# Patient Record
Sex: MALE | Race: BLACK OR AFRICAN AMERICAN
[De-identification: names, ages, dates, MRNs, and addresses within clinical notes are randomized per-mention and may not be internally consistent; named-entity substitution may affect disease eponyms.]

---

## 2018-02-10 ENCOUNTER — HOSPITAL ENCOUNTER (OUTPATIENT)
Dept: HOSPITAL 75 - ER | Age: 53
Setting detail: OBSERVATION
LOS: 1 days | Discharge: HOME | End: 2018-02-11
Attending: FAMILY MEDICINE | Admitting: FAMILY MEDICINE
Payer: SELF-PAY

## 2018-02-10 VITALS — HEIGHT: 68 IN | WEIGHT: 182.19 LBS | BODY MASS INDEX: 27.61 KG/M2

## 2018-02-10 VITALS — SYSTOLIC BLOOD PRESSURE: 180 MMHG | DIASTOLIC BLOOD PRESSURE: 97 MMHG

## 2018-02-10 DIAGNOSIS — F10.920: ICD-10-CM

## 2018-02-10 DIAGNOSIS — Z79.899: ICD-10-CM

## 2018-02-10 DIAGNOSIS — I10: ICD-10-CM

## 2018-02-10 DIAGNOSIS — R07.89: Primary | ICD-10-CM

## 2018-02-10 LAB
ALBUMIN SERPL-MCNC: 4.5 GM/DL (ref 3.2–4.5)
ALP SERPL-CCNC: 107 U/L (ref 40–136)
ALT SERPL-CCNC: 27 U/L (ref 0–55)
AMYLASE SERPL-CCNC: 166 U/L (ref 25–125)
APAP SERPL-MCNC: < 10 UG/ML (ref 10–30)
APTT BLD: 25 SEC (ref 24–35)
BARBITURATES UR QL: NEGATIVE
BASOPHILS # BLD AUTO: 0.1 10^3/UL (ref 0–0.1)
BASOPHILS NFR BLD AUTO: 1 % (ref 0–10)
BENZODIAZ UR QL SCN: NEGATIVE
BILIRUB SERPL-MCNC: 0.2 MG/DL (ref 0.1–1)
BUN/CREAT SERPL: 16
CALCIUM SERPL-MCNC: 9.2 MG/DL (ref 8.5–10.1)
CHLORIDE SERPL-SCNC: 109 MMOL/L (ref 98–107)
CO2 SERPL-SCNC: 24 MMOL/L (ref 21–32)
COCAINE UR QL: NEGATIVE
CREAT SERPL-MCNC: 1.22 MG/DL (ref 0.6–1.3)
EOSINOPHIL # BLD AUTO: 0.1 10^3/UL (ref 0–0.3)
EOSINOPHIL NFR BLD AUTO: 1 % (ref 0–10)
ERYTHROCYTE [DISTWIDTH] IN BLOOD BY AUTOMATED COUNT: 14.7 % (ref 10–14.5)
GFR SERPLBLD BASED ON 1.73 SQ M-ARVRAT: > 60 ML/MIN
GLUCOSE SERPL-MCNC: 85 MG/DL (ref 70–105)
HCT VFR BLD CALC: 38 % (ref 40–54)
HGB BLD-MCNC: 14.1 G/DL (ref 13.3–17.7)
INR PPP: 0.9 (ref 0.8–1.4)
LIPASE SERPL-CCNC: 13 U/L (ref 8–78)
LYMPHOCYTES # BLD AUTO: 3 X 10^3 (ref 1–4)
LYMPHOCYTES NFR BLD AUTO: 33 % (ref 12–44)
MAGNESIUM SERPL-MCNC: 2.5 MG/DL (ref 1.8–2.4)
MANUAL DIFFERENTIAL PERFORMED BLD QL: NO
MCH RBC QN AUTO: 30 PG (ref 25–34)
MCHC RBC AUTO-ENTMCNC: 37 G/DL (ref 32–36)
MCV RBC AUTO: 81 FL (ref 80–99)
METHADONE UR QL SCN: NEGATIVE
METHAMPHETAMINE SCREEN URINE S: NEGATIVE
MONOCYTES # BLD AUTO: 0.5 X 10^3 (ref 0–1)
MONOCYTES NFR BLD AUTO: 5 % (ref 0–12)
MYOGLOBIN SERPL-MCNC: 104.8 NG/ML (ref 10–92)
MYOGLOBIN SERPL-MCNC: 87.1 NG/ML (ref 10–92)
NEUTROPHILS # BLD AUTO: 5.4 X 10^3 (ref 1.8–7.8)
NEUTROPHILS NFR BLD AUTO: 60 % (ref 42–75)
OPIATES UR QL SCN: NEGATIVE
OXYCODONE UR QL: NEGATIVE
PLATELET # BLD: 274 10^3/UL (ref 130–400)
PMV BLD AUTO: 11.2 FL (ref 7.4–10.4)
POTASSIUM SERPL-SCNC: 4.7 MMOL/L (ref 3.6–5)
PROPOXYPH UR QL: NEGATIVE
PROT SERPL-MCNC: 8 GM/DL (ref 6.4–8.2)
PROTHROMBIN TIME: 11.9 SEC (ref 12.2–14.7)
RBC # BLD AUTO: 4.69 10^6/UL (ref 4.35–5.85)
SALICYLATES SERPL-MCNC: < 5 MG/DL (ref 5–20)
SODIUM SERPL-SCNC: 146 MMOL/L (ref 135–145)
TRICYCLICS UR QL SCN: NEGATIVE
WBC # BLD AUTO: 9 10^3/UL (ref 4.3–11)

## 2018-02-10 PROCEDURE — 85610 PROTHROMBIN TIME: CPT

## 2018-02-10 PROCEDURE — 83735 ASSAY OF MAGNESIUM: CPT

## 2018-02-10 PROCEDURE — 96360 HYDRATION IV INFUSION INIT: CPT

## 2018-02-10 PROCEDURE — 80329 ANALGESICS NON-OPIOID 1 OR 2: CPT

## 2018-02-10 PROCEDURE — 83874 ASSAY OF MYOGLOBIN: CPT

## 2018-02-10 PROCEDURE — 85730 THROMBOPLASTIN TIME PARTIAL: CPT

## 2018-02-10 PROCEDURE — 80053 COMPREHEN METABOLIC PANEL: CPT

## 2018-02-10 PROCEDURE — 80320 DRUG SCREEN QUANTALCOHOLS: CPT

## 2018-02-10 PROCEDURE — 84484 ASSAY OF TROPONIN QUANT: CPT

## 2018-02-10 PROCEDURE — 85025 COMPLETE CBC W/AUTO DIFF WBC: CPT

## 2018-02-10 PROCEDURE — 80061 LIPID PANEL: CPT

## 2018-02-10 PROCEDURE — 83690 ASSAY OF LIPASE: CPT

## 2018-02-10 PROCEDURE — 96361 HYDRATE IV INFUSION ADD-ON: CPT

## 2018-02-10 PROCEDURE — 36415 COLL VENOUS BLD VENIPUNCTURE: CPT

## 2018-02-10 PROCEDURE — 93041 RHYTHM ECG TRACING: CPT

## 2018-02-10 PROCEDURE — 71045 X-RAY EXAM CHEST 1 VIEW: CPT

## 2018-02-10 PROCEDURE — 85379 FIBRIN DEGRADATION QUANT: CPT

## 2018-02-10 PROCEDURE — 93005 ELECTROCARDIOGRAM TRACING: CPT

## 2018-02-10 PROCEDURE — 80306 DRUG TEST PRSMV INSTRMNT: CPT

## 2018-02-10 PROCEDURE — 82150 ASSAY OF AMYLASE: CPT

## 2018-02-10 RX ADMIN — SODIUM CHLORIDE SCH MLS/HR: 900 INJECTION, SOLUTION INTRAVENOUS at 18:30

## 2018-02-10 NOTE — ED CHEST PAIN
General


Stated Complaint:  CP


Source:  patient


Exam Limitations:  no limitations





History of Present Illness


Date Seen by Provider:  Feb 10, 2018


Time Seen by Provider:  14:04


Initial Comments


Here with report of central chest pain that goes to his back.  She is very 

vague in the descriptions and admits to drinking quite a bit of beer today.  He 

states that he basically started drinking after he got up this morning.  States 

the chest pain started yesterday but he had to go to work.  He works at [employer].  He did work all day yesterday and went home last night.  This 

morning, after drinking quite a bit, he thought that he probably ought to get 

his chest pain checked out.  It is unclear chest pain this morning but may have 

had some.  He denies chest pain currently.  He does admit to taking 2 aspirin 

but is not sure if they are baby aspirin or regular strength but they were from 

[employer].  Denies nausea, vomiting, sweating or weakness.


Timing/Duration:  intermittent, gone now, 2-3 days


Severity/Quality:  moderate, aching


Location:  central


Radiation:  back


Prior CP/Workup:  no prior chest pain


ASA po PTA:  Yes


NTG SL PTA:  No


Associated Symptoms:  back pain, No fatigue, No nausea/vomiting, No shortness 

of breath, No weakness





Allergies and Home Medications


Allergies


Coded Allergies:  


     No Known Drug Allergies (Unverified , 2/10/18)





Review of Systems


Constitutional:  see HPI, No chills, No fever


EENTM:  No Symptoms Reported


Respiratory:  No Symptoms Reported


Cardiovascular:  See HPI, Chest Pain, Denies Edema


Gastrointestinal:  Denies Nausea, Denies Vomiting


Genitourinary:  No Symptoms Reported


Musculoskeletal:  see HPI, back pain, No neck pain


Skin:  no symptoms reported


Psychiatric/Neurological:  No Symptoms Reported





All Other Systems Reviewed


Negative Unless Noted:  Yes





Past Medical-Social-Family Hx


Patient Social History


Alcohol Use:  Regular Use


Recreational Drug Use:  No


Smoking Status:  Never a Smoker





Surgeries


History of Surgeries:  Yes


Surgeries:  Orthopedic





Respiratory


History of Respiratory Disorde:  No





Cardiovascular


History of Cardiac Disorders:  No





Neurological


History of Neurological Disord:  No





Genitourinary


History of Genitourinary Disor:  No





Gastrointestinal


History of Gastrointestinal Di:  No





Musculoskeletal


History of Musculoskeletal Dis:  No


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 

the elbow)





Reviewed Nursing Assessment


Reviewed/Agree w Nursing PMH:  Yes





Family Medical History


Significant Family History:  No Pertinent Family Hx





Physical Exam


Vital Signs





Vital Signs - First Documented




















Capillary Refill :


General Appearance:  No Apparent Distress, WD/WN, Other (inebriated with 

slurred speech)


HEENT:  PERRL/EOMI, Pharynx Normal


Neck:  Non Tender, Supple


Respiratory:  Lungs Clear, Normal Breath Sounds


Cardiovascular:  Regular Rate, Rhythm, No Murmur


Gastrointestinal:  Non Tender, Soft


Extremity:  Normal Range of Motion, Non Tender


Neurologic/Psychiatric:  Alert, Oriented x3


Skin:  Normal Color, Warm/Dry





Progress/Results/Core Measures


Results/Orders


Lab Results





Laboratory Tests








Test


  2/10/18


14:13 2/10/18


14:56 2/10/18


16:08 Range/Units


 


 


White Blood Count


  9.0 


  


  


  4.3-11.0


10^3/uL


 


Red Blood Count


  4.69 


  


  


  4.35-5.85


10^6/uL


 


Hemoglobin 14.1    13.3-17.7  G/DL


 


Hematocrit 38 L   40-54  %


 


Mean Corpuscular Volume 81    80-99  FL


 


Mean Corpuscular Hemoglobin 30    25-34  PG


 


Mean Corpuscular Hemoglobin


Concent 37 H


  


  


  32-36  G/DL


 


 


Red Cell Distribution Width 14.7 H   10.0-14.5  %


 


Platelet Count


  274 


  


  


  130-400


10^3/uL


 


Mean Platelet Volume 11.2 H   7.4-10.4  FL


 


Neutrophils (%) (Auto) 60    42-75  %


 


Lymphocytes (%) (Auto) 33    12-44  %


 


Monocytes (%) (Auto) 5    0-12  %


 


Eosinophils (%) (Auto) 1    0-10  %


 


Basophils (%) (Auto) 1    0-10  %


 


Neutrophils # (Auto) 5.4    1.8-7.8  X 10^3


 


Lymphocytes # (Auto) 3.0    1.0-4.0  X 10^3


 


Monocytes # (Auto) 0.5    0.0-1.0  X 10^3


 


Eosinophils # (Auto)


  0.1 


  


  


  0.0-0.3


10^3/uL


 


Basophils # (Auto)


  0.1 


  


  


  0.0-0.1


10^3/uL


 


Prothrombin Time 11.9 L   12.2-14.7  SEC


 


INR Comment 0.9    0.8-1.4  


 


Activated Partial


Thromboplast Time 25 


  


  


  24-35  SEC


 


 


D-Dimer


  < 0.27 


  


  


  0.00-0.49


UG/ML


 


Sodium Level 146 H   135-145  MMOL/L


 


Potassium Level 4.7    3.6-5.0  MMOL/L


 


Chloride Level 109 H     MMOL/L


 


Carbon Dioxide Level 24    21-32  MMOL/L


 


Anion Gap 13    5-14  MMOL/L


 


Blood Urea Nitrogen 20 H   7-18  MG/DL


 


Creatinine


  1.22 


  


  


  0.60-1.30


MG/DL


 


Estimat Glomerular Filtration


Rate > 60 


  


  


   


 


 


BUN/Creatinine Ratio 16     


 


Glucose Level 85      MG/DL


 


Calcium Level 9.2    8.5-10.1  MG/DL


 


Magnesium Level 2.5 H   1.8-2.4  MG/DL


 


Total Bilirubin 0.2    0.1-1.0  MG/DL


 


Aspartate Amino Transf


(AST/SGOT) 37 H


  


  


  5-34  U/L


 


 


Alanine Aminotransferase


(ALT/SGPT) 27 


  


  


  0-55  U/L


 


 


Alkaline Phosphatase 107      U/L


 


Myoglobin


  87.1 


  


  104.8 H


  10.0-92.0


NG/ML


 


Troponin I < 0.30   < 0.30  <0.30  NG/ML


 


Total Protein 8.0    6.4-8.2  GM/DL


 


Albumin 4.5    3.2-4.5  GM/DL


 


Amylase Level 166 H     U/L


 


Lipase 13    8-78  U/L


 


Salicylates Level < 5.0 L   5.0-20.0  MG/DL


 


Acetaminophen Level < 10 L   10-30  UG/ML


 


Serum Alcohol 319 *H   <10  MG/DL


 


Urine Opiates Screen  NEGATIVE   NEGATIVE  


 


Urine Oxycodone Screen  NEGATIVE   NEGATIVE  


 


Urine Methadone Screen  NEGATIVE   NEGATIVE  


 


Urine Propoxyphene Screen  NEGATIVE   NEGATIVE  


 


Urine Barbiturates Screen  NEGATIVE   NEGATIVE  


 


Ur Tricyclic Antidepressants


Screen 


  NEGATIVE 


  


  NEGATIVE  


 


 


Urine Phencyclidine Screen  NEGATIVE   NEGATIVE  


 


Urine Amphetamines Screen  NEGATIVE   NEGATIVE  


 


Urine Methamphetamines Screen  NEGATIVE   NEGATIVE  


 


Urine Benzodiazepines Screen  NEGATIVE   NEGATIVE  


 


Urine Cocaine Screen  NEGATIVE   NEGATIVE  


 


Urine Cannabinoids Screen  NEGATIVE   NEGATIVE  








My Orders





Orders - ETHAN LENTZ MD


Cbc With Automated Diff (2/10/18 14:10)


Magnesium (2/10/18 14:10)


Chest 1 View, Ap/Pa Only (2/10/18 14:10)


Ekg Tracing (2/10/18 14:10)


Cardiac Profile 1 (2/10/18 14:10)


Comprehensive Metabolic Panel (2/10/18 14:10)


Myoglobin Serum (2/10/18 14:10)


Protime With Inr (2/10/18 14:10)


Partial Thromboplastin Time (2/10/18 14:10)


O2 (2/10/18 14:10)


Monitor-Rhythm Ecg Trace Only (2/10/18 14:10)


Lipid Panel (18 06:00)


Aspirin Chewable Tablet (Baby Aspirin Ch (2/10/18 14:15)


Saline Lock/Iv-Start (2/10/18 14:10)


Lipase (2/10/18 14:10)


Amylase (2/10/18 14:10)


Fibrin Degradation Products (2/10/18 14:10)


Acetaminophen (2/10/18 14:10)


Alcohol (2/10/18 14:10)


Drug Screen Stat (Urine) (2/10/18 14:10)


Salicylate (2/10/18 14:10)


Ns Iv 500 Ml (Sodium Chloride 0.9%) (2/10/18 14:25)


Troponin I (2/10/18 16:28)


Myoglobin Serum (2/10/18 16:28)


Ns Iv 500 Ml (Sodium Chloride 0.9%) (2/10/18 16:28)


Ekg Tracing (2/10/18 16:28)





Medications Given in ED





Current Medications








 Medications  Dose


 Ordered  Sig/Enedina


 Route  Start Time


 Stop Time Status Last Admin


Dose Admin


 


 Aspirin  324 mg  ONCE  ONCE


 PO  2/10/18 14:15


 2/10/18 14:16 DC 2/10/18 14:21


324 MG


 


 Sodium Chloride  500 ml @ 0


 mls/hr  Q0M ONCE


 IV  2/10/18 14:25


 2/10/18 14:26 DC 2/10/18 14:45


0 MLS/HR


 


 Sodium Chloride  500 ml @ 0


 mls/hr  Q0M ONCE


 IV  2/10/18 16:28


 2/10/18 16:30 DC 2/10/18 17:13


0 MLS/HR








Vital Signs/I&O





Vital Sign - Last 12Hours








 2/10/18 2/10/18 2/10/18





 14:04 14:04 14:04


 


Pulse 88  


 


Resp 14  


 


B/P (MAP) 169/110 (129)  


 


Pulse Ox 98 98 


 


O2 Delivery Room Air Room Air Room Air








Progress Note :  


Progress Note


Seen and evaluated.  IV, labs, EKG and chest x-ray ordered.   mg by 

mouth ordered as patient's history is unreliable.  Normal saline 500 mL bolus.  

We will add drug screen and alcohol screen given his current inebriation.  1530

: Patient still without chest pain.  We will re-evaluate troponin and EKG at a 

greater than 2 hour nissa and reevaluate for inability to send home safely.  1710

: EKG unchanged.  Troponin still less than 0.30, myoglobin has elevated.  In 

this setting, admission is indicated.  I did discuss the case with Dr. Mahmood and 

she accepts patient for admission, observation status.  I did discuss the case 

with Dr. Franks and he accepts patient for consult.  2-D cardiac echo in the 

a.m.  This all was discussed with the patient who agrees to admission.





ECG


Initial ECG Impression Date:  Feb 10, 2018


Initial ECG Impression Time:  14:04


Initial ECG Rate:  89


Initial ECG Rhythm:  Normal Sinus


Comment


Sinus rhythm with normal axis.  No evidence of ST elevation. No previous 

available for comparison.  Interpreted by me.


EKG :  


   EKG Time:  16:29


   Rate:  87


   ECG Comparisson:  Unchanged


   ECG Impression:  Normal


Comment


Sinus rhythm with left atrial abnormality.  Overall unchanged from previous 

done earlier today.  No evidence of ST elevation MI.  Interpreted by me.





Diagnostic Imaging





   Diagonstic Imaging:  Xray


   Plain Films/CT/US/NM/MRI:  chest


Comments


 VIA [facility], [facility].


 [place], [place]





NAME:   SUKI LOVING


[employer] REC#:   [id number]


ACCOUNT#:   [id number]


PT STATUS:   REG ER


:   1965


PHYSICIAN:   ETHAN LENTZ MD


ADMIT DATE:   02/10/18/ER


 ***Draft***


Date of Exam:02/10/18





CHEST 1 VIEW, AP/PA ONLY








EXAMINATION: Portable erect AP chest obtained at  232h.





INDICATION: Chest pain





The heart size is within normal limits and stable when compared


to 10/07/2008. The crowded bronchovascular markings in the right


infrahilar region seen on the prior study are again evident and


no different. There is no sign of failure, pneumonia or a pleural


effusion to suggest an acute abnormality. The mediastinum is not


widened. The osseous structures are intact. There is deformity of


the left clavicle due to prior trauma. 





IMPRESSION:


There is no evidence for an acute cardiopulmonary abnormality. 





  Dictated on workstation # [id number]








Dict:   02/10/18 [id number]


Trans:   02/10/18 [address] [place] [address] [id number]





Interpreted by:     KODAK HAMM MD


Electronically signed by:





Departure


Communication (Admissions)


Time/Spoke to Admitting Phy:  17:10


Time/Spoke to Consulting Phy:  17:14





Impression


Impression:  


 Primary Impression:  


 Chest pain


 Qualified Codes:  R07.9 - Chest pain, unspecified


 Additional Impression:  


 Hypertension


 Qualified Codes:  I10 - Essential (primary) hypertension


Disposition:   ADMITTED AS INPATIENT


Condition:  Stable





Admissions


Decision to Admit Reason:  Admit from ER (General)


Decision to Admit/Date:  Feb 10, 2018


Time/Decision to Admit Time:  17:10





Departure-Patient Inst.


Referrals:  


NO,LOCAL PHYSICIAN (PCP/Family)


Primary Care Physician











ETHAN LENTZ MD Feb 10, 2018 14:24

## 2018-02-10 NOTE — DIAGNOSTIC IMAGING REPORT
EXAMINATION: Portable erect AP chest obtained at  232h.



INDICATION: Chest pain



The heart size is within normal limits and stable when compared

to 10/07/2008. The crowded bronchovascular markings in the right

infrahilar region seen on the prior study are again evident and

no different. There is no sign of failure, pneumonia or a pleural

effusion to suggest an acute abnormality. The mediastinum is not

widened. The osseous structures are intact. There is deformity of

the left clavicle due to prior trauma. 



IMPRESSION:

There is no evidence for an acute cardiopulmonary abnormality. 



Dictated by: 



  Dictated on workstation # [id number]

## 2018-02-11 VITALS — DIASTOLIC BLOOD PRESSURE: 76 MMHG | SYSTOLIC BLOOD PRESSURE: 136 MMHG

## 2018-02-11 VITALS — SYSTOLIC BLOOD PRESSURE: 121 MMHG | DIASTOLIC BLOOD PRESSURE: 64 MMHG

## 2018-02-11 VITALS — SYSTOLIC BLOOD PRESSURE: 120 MMHG | DIASTOLIC BLOOD PRESSURE: 72 MMHG

## 2018-02-11 LAB
ALBUMIN SERPL-MCNC: 3.9 GM/DL (ref 3.2–4.5)
ALP SERPL-CCNC: 94 U/L (ref 40–136)
ALT SERPL-CCNC: 22 U/L (ref 0–55)
BASOPHILS # BLD AUTO: 0.1 10^3/UL (ref 0–0.1)
BASOPHILS NFR BLD AUTO: 1 % (ref 0–10)
BILIRUB SERPL-MCNC: 0.5 MG/DL (ref 0.1–1)
BUN/CREAT SERPL: 17
CALCIUM SERPL-MCNC: 8.2 MG/DL (ref 8.5–10.1)
CHLORIDE SERPL-SCNC: 106 MMOL/L (ref 98–107)
CHOLEST SERPL-MCNC: 216 MG/DL (ref ?–200)
CO2 SERPL-SCNC: 15 MMOL/L (ref 21–32)
CREAT SERPL-MCNC: 0.83 MG/DL (ref 0.6–1.3)
EOSINOPHIL # BLD AUTO: 0.4 10^3/UL (ref 0–0.3)
EOSINOPHIL NFR BLD AUTO: 5 % (ref 0–10)
ERYTHROCYTE [DISTWIDTH] IN BLOOD BY AUTOMATED COUNT: 14.9 % (ref 10–14.5)
GFR SERPLBLD BASED ON 1.73 SQ M-ARVRAT: > 60 ML/MIN
GLUCOSE SERPL-MCNC: 66 MG/DL (ref 70–105)
HCT VFR BLD CALC: 39 % (ref 40–54)
HDLC SERPL-MCNC: 82 MG/DL (ref 40–60)
HGB BLD-MCNC: 14.1 G/DL (ref 13.3–17.7)
LYMPHOCYTES # BLD AUTO: 2.4 X 10^3 (ref 1–4)
LYMPHOCYTES NFR BLD AUTO: 25 % (ref 12–44)
MANUAL DIFFERENTIAL PERFORMED BLD QL: NO
MCH RBC QN AUTO: 29 PG (ref 25–34)
MCHC RBC AUTO-ENTMCNC: 36 G/DL (ref 32–36)
MCV RBC AUTO: 81 FL (ref 80–99)
MONOCYTES # BLD AUTO: 0.6 X 10^3 (ref 0–1)
MONOCYTES NFR BLD AUTO: 6 % (ref 0–12)
NEUTROPHILS # BLD AUTO: 6.1 X 10^3 (ref 1.8–7.8)
NEUTROPHILS NFR BLD AUTO: 64 % (ref 42–75)
PLATELET # BLD: 146 10^3/UL (ref 130–400)
PMV BLD AUTO: 11.7 FL (ref 7.4–10.4)
POTASSIUM SERPL-SCNC: 4.4 MMOL/L (ref 3.6–5)
PROT SERPL-MCNC: 7.1 GM/DL (ref 6.4–8.2)
RBC # BLD AUTO: 4.79 10^6/UL (ref 4.35–5.85)
SODIUM SERPL-SCNC: 136 MMOL/L (ref 135–145)
TRIGL SERPL-MCNC: 46 MG/DL (ref ?–150)
VLDLC SERPL CALC-MCNC: 9 MG/DL (ref 5–40)
WBC # BLD AUTO: 9.6 10^3/UL (ref 4.3–11)

## 2018-02-11 RX ADMIN — SODIUM CHLORIDE SCH MLS/HR: 900 INJECTION, SOLUTION INTRAVENOUS at 07:44

## 2018-02-11 NOTE — CONSULTATION-CARDIOLOGY
HPI-Cardiology


Cardiology Consultation:


Date of Consultation


18


Date of Admission





Attending Physician


Valentina Mahmood MD


Admitting Physician


No,Local Physician


Consulting Physician


ANDI FRANKS MD





HPI:


Time Seen by Provider:  11:50


Chief Complaint:


Chest pain


This is a 52-year-old gentleman who presented to the ER with chest pain.  

Recurrent episodes of chest pain.  He has no prior history of any cardiac 

disease.  He does have history of hypertension new-onset.  Chest pain started 

on the day of admission during alcohol intake.  History is vague.  Substernal 

chest pain with radiation to the back.  No further chest pain on my interview.  

No radiation and no other associated cardiac symptoms.  No exacerbating or 

relieving factors.  No significant premature family history of CAD.  Patient 

denies smoking but significant history of alcohol.  Denies other street drugs.





Review of Systems-Cardiology


Review of Systems


Constitutional:  No As described under HPI, No no symptoms reported, No chills, 

No fever, No lightheadedness, No malaise, No tiredness, No weight loss, No 

weight gain, No other


Eyes:  No As described under HPI, No no symptoms reported, No blindness, No 

blurred vision, No contact lenses, No drainage, No decreased acuity, No foreign 

body sensation, No glasses, No inflammation, No pain, No photophobia, No 

previous injury, No shadows, No tunnel vision, No other, No vision change


Ears/Nose/Throat:  No As described under HPI, No no symptoms reported, No 

chronic hearing loss, No epistaxis, No ear discharge, No ear pain, No loose 

teeth, No mouth pain, No mouth swelling, No nasal drainage, No nose pain, No 

recent hearing loss, No throat pain, No throat swelling, No ulcerations, No 

other


Respiratory:  No no symptoms reported, No As described under HPI, No cough, No 

orthopnea, No shortness of breath, No SOB with excertion, No SOB at rest, No 

stridor, No wheezing, No other


Cardiovascular:  chest pain


Gastrointestinal:  No no symptoms reported, No As described under HPI, No 

abdomen distended, No abdominal pain, No blood streaked bowels, No constipation

, No diarrhea, No difficulty swallowing, No nausea, No poor appetite, No poor 

fluid intake, No rectal bleeding, No vomiting, No other, No nausea/vomiting/

diarrhea, No stool coloration changes


Genitourinary:  No no symptoms reported, No As described under HPI, No burning, 

No dysuria, No discharge, No frequency, No flank pain, No hematuria, No 

incontinence, No pain, No urgency, No other, No urine frequency changes, No 

urine coloration changes


Musculoskeletal:  No no symptoms reported, No As describe under HPI, No back 

pain, No gout, No joint pain, No joint swelling, No muscle pain, No muscle 

stiffness, No neck pain, No other


Skin:  No no symptoms reported, No As described under HPI, No change in color, 

No change in hair/nails, No dryness, No lesions, No lumps, No rash, No other, 

No skin related problems, No ulcerations, No rash on exposed areas, No 

ulcerations on exposed areas


Psychiatric/Neurological:  No no symptoms reported, No As described under HPI, 

No anxiety, No depression, No emotional problems, No headache, No numbness, No 

pre-existing deficit, No seizure, No tingling, No tremors, No weakness, No other

, No focal weakness, No syncope


Hematologic:  No no symptoms reported, No As described under HPI, No anemia, No 

blood clots, No easy bleeding, No easy bruising, No swollen glands, No other, 

No bleeding abnormalities





All Other Systems Reviewed


Negative Unless Noted:  Yes





PMH-Social-Family Hx


Patient Social History


Employed/Student:  employed


Alcohol Use:  Regular Use (12 pack on weekends)


Recreational Drug Use:  No


Smoking Status:  Never a Smoker


Recent Foreign Travel:  No


Recent Infectious Disease Expo:  No


Physical Abuse Screen:  No


Sexual Abuse:  No





Past Medical History


PMH


As described under Assessment.





Family Medical History


Family History:  


Myocardial infarction


  19 MOTHER


  G8 BROTHER


  G8 SISTER


Neoplasm


  19 FATHER





Allergies and Home Medications


Allergies


Coded Allergies:  


     No Known Drug Allergies (Unverified , 2/10/18)





Home Medications


Amlodipine Besylate 5 Mg Tablet, 5 MG PO DAILY, #30


   Prescribed by: VALENTINA MAHMOOD on 18 1135


Aspirin 81 Mg Tab.chew, 81 MG PO DAILY, #30


   Prescribed by: VALENTINA MAHMOOD on 18 1135





Physical Exam-Cardiology


Physical Exam


Vital Signs/I&O





Vital Sign - Last 12Hours








 18





 00:20 01:00 04:20 07:00


 


Temp 98.5  98.2 


 


Pulse 75 62 68 75


 


Resp 20  20 


 


B/P (MAP) 121/64 (83)  120/72 (88) 


 


Pulse Ox 98  97 


 


O2 Delivery Room Air  Room Air 














Intake and Output 


 


 18





 00:00


 


Intake Total 250 ml


 


Output Total 700 ml


 


Balance -450 ml





Capillary Refill : Less Than 3 Seconds


Constitutional:  appears stated age, AAO x 3


HEENT:  PERRL, No normal ENT inspection, No TMs normal, No pharynx normal, No 

scleral icterus (R), No scleral icterus (L), No pale conjunctivae (R), No pale 

conjunctivae (L), No photophobia, No TM abnormal (R), No TM abnormal (L), No 

pharyngeal erythema, No tonsillar exudate, No other, No discharge, No EOMI, 

hearing is well preserved, No hard of hearing, oral hygience is good, No 

ulceration, No xanthelasmas are seen


Neck:  No non-tender, No full range of motion, No supple, No normal inspection, 

No carotid bruit, No limited range of motion, No lymphadenopathy (R), No 

lymphadenopathy (L), No tender lateral, No tender midline, No thyromegaly, No 

other, carotid pulses are 2 + bilaterally, No with good upstrokes


Respiratory:  chest is bilaterally symmetric, lungs clear to percussion, lungs 

clear to auscultation


Cardiovascular:  regular rate-rhythm, No irregularly irregular, No extra beats, 

No parasternal heave is noted, No JVD, No edema, No bradycardia, No tachycardia

, No point of maximal impulse, No cardiac thrills are palpable, S1 and S2, No 

gallop/S3, No gallop/S4, No diastolic murmur, No systolic murmur, No friction 

rub, No click, No other


Gastrointestinal:  No tender, No soft, No round, No distended, No pulsatile mass

, No organomegaly, No guarding, No rebound, No tenderness, No hernia, No mass, 

No audible bowel sounds, No abnormal bowel sounds, No abdominal bruits, No 

spleenomegaly, No other


Rectal:  deferred


Extremities:  normal range of motion, non-tender, normal inspection, No pedal 

edema, No calf tenderness, No normal capillary refill, No pelvis stable, No 

calf tenderness, No inflammation, No pedal edema, No slow capillary refill, No 

swelling, No other, No abrasion, No clubbing, No cyanosis, No ecchymosis, No 

laceration, No no lower extremity edema bilateral, No significant edema, No 

tenderness, No wound


Neurologic/Psychiatric:  No CNs II-XII nml as tested, no motor/sensory deficits

, alert, normal mood/affect, oriented x 3, No abnormal cerebellar tests, No 

abnormal CNs II-XII, No abnormal gait, No aphasia, No EOM palsy, No facial droop

, No motor weakness, No sensory deficit, No depressed affect, No disoriented x 3

, No other, No grossly intact, No power is 5/5 both on sides


Skin:  No normal color, No warm/dry, No cyanosis, No cool, No diaphoresis, No 

damp, No ecchymosis, No jaundice, No mottled, No pallor, No rash, No tattoos/

piercings, No ulcerations, No rash on exposed areas, No ulcerations on exposed 

areas, No other





Data Review


Labs


Laboratory Tests


2/10/18 14:13: 


White Blood Count 9.0, Red Blood Count 4.69, Hemoglobin 14.1, Hematocrit 38L, 

Mean Corpuscular Volume 81, Mean Corpuscular Hemoglobin 30, Mean Corpuscular 

Hemoglobin Concent 37H, Red Cell Distribution Width 14.7H, Platelet Count 274, 

Mean Platelet Volume 11.2H, Neutrophils (%) (Auto) 60, Lymphocytes (%) (Auto) 33

, Monocytes (%) (Auto) 5, Eosinophils (%) (Auto) 1, Basophils (%) (Auto) 1, 

Neutrophils # (Auto) 5.4, Lymphocytes # (Auto) 3.0, Monocytes # (Auto) 0.5, 

Eosinophils # (Auto) 0.1, Basophils # (Auto) 0.1, Prothrombin Time 11.9L, INR 

Comment 0.9, Activated Partial Thromboplast Time 25, D-Dimer < 0.27, Sodium 

Level 146H, Potassium Level 4.7, Chloride Level 109H, Carbon Dioxide Level 24, 

Anion Gap 13, Blood Urea Nitrogen 20H, Creatinine 1.22, Estimat Glomerular 

Filtration Rate > 60, BUN/Creatinine Ratio 16, Glucose Level 85, Calcium Level 

9.2, Magnesium Level 2.5H, Total Bilirubin 0.2, Aspartate Amino Transf (AST/SGOT

) 37H, Alanine Aminotransferase (ALT/SGPT) 27, Alkaline Phosphatase 107, 

Myoglobin 87.1, Troponin I < 0.30, Total Protein 8.0, Albumin 4.5, Amylase 

Level 166H, Lipase 13, Salicylates Level < 5.0L, Acetaminophen Level < 10L, 

Serum Alcohol 319*H


2/10/18 14:56: 


Urine Opiates Screen NEGATIVE, Urine Oxycodone Screen NEGATIVE, Urine Methadone 

Screen NEGATIVE, Urine Propoxyphene Screen NEGATIVE, Urine Barbiturates Screen 

NEGATIVE, Ur Tricyclic Antidepressants Screen NEGATIVE, Urine Phencyclidine 

Screen NEGATIVE, Urine Amphetamines Screen NEGATIVE, Urine Methamphetamines 

Screen NEGATIVE, Urine Benzodiazepines Screen NEGATIVE, Urine Cocaine Screen 

NEGATIVE, Urine Cannabinoids Screen NEGATIVE


2/10/18 16:08: 


Myoglobin 104.8H, Troponin I < 0.30


18 04:58: 


White Blood Count 9.6, Red Blood Count 4.79, Hemoglobin 14.1, Hematocrit 39L, 

Mean Corpuscular Volume 81, Mean Corpuscular Hemoglobin 29, Mean Corpuscular 

Hemoglobin Concent 36, Red Cell Distribution Width 14.9H, Platelet Count 146, 

Mean Platelet Volume 11.7H, Neutrophils (%) (Auto) 64, Lymphocytes (%) (Auto) 25

, Monocytes (%) (Auto) 6, Eosinophils (%) (Auto) 5, Basophils (%) (Auto) 1, 

Neutrophils # (Auto) 6.1, Lymphocytes # (Auto) 2.4, Monocytes # (Auto) 0.6, 

Eosinophils # (Auto) 0.4H, Basophils # (Auto) 0.1, Sodium Level 136, Potassium 

Level 4.4, Chloride Level 106, Carbon Dioxide Level 15L, Anion Gap 15H, Blood 

Urea Nitrogen 14, Creatinine 0.83, Estimat Glomerular Filtration Rate > 60, BUN/

Creatinine Ratio 17, Glucose Level 66L, Calcium Level 8.2L, Total Bilirubin 0.5

, Aspartate Amino Transf (AST/SGOT) 32, Alanine Aminotransferase (ALT/SGPT) 22, 

Alkaline Phosphatase 94, Total Protein 7.1, Albumin 3.9, Triglycerides Level 46

, Cholesterol Level 216H, LDL Cholesterol Direct 109, VLDL Cholesterol 9, HDL 

Cholesterol 82H


18 09:05: Troponin I < 0.30








ECG Impression


ECG


Initial ECG Rhythm:  Normal Sinus


Initial ECG Impression:  Normal





A/P-Cardiology


Assessment/Admission Diagnosis


Chest pain,


Alcohol intoxication,


Hypertension





Plan


Chest pain: Acute coronary syndrome ruled out with negative serial troponin.  

EKG did not show any acute ST-T wave abnormalities.  Pharmacological nuclear 

stress test as an outpatient.  Please schedule follow-up with my office.





Alcohol intoxication: Recommended abstinence.





Hypertension: Amlodipine.  Good control now.





Okay to discharge with follow-up with my office in the next one to 2 weeks.








Thank you for your consultation. Please call me if you have any questions.








ROBERTO Franks MD, FACP, FACC, FSCAI, FHRS, CCDS


Interventional Cardiology


Cardiac Electrophysiology


Vascular Medicine and Endovascular Interventions





Clinical Quality Measures


AMI/AHF:


ASA po Prior to arrival:  Yes





DVT/VTE Risk/Contraindication:


Risk Factor Score Per Nursin


RFS Level Per Nursing on Admit:  1=Low/No VTE PPX











ANDI FRANKS MD 2018 11:50 am

## 2018-02-11 NOTE — SHORT STAY SUMMARY-HOSPITALIST
HPI


History of Present Illness:


HPI/Chief Complaint


Pt is a 52yoAAM who presented to the ER with chest pain. He reports he first 

had chest pain on  while he was eating a bologna sandwich. He denies any 

previous history of heart disease or chest pain. His pain then recurred 

yesterday while he was drinking. He was not doing anything exertional when it 

started though he doesn't remember exactly what he was doing when it began. He 

stated it was in the center of his chest and radiated to his back. It is now 

resolved. He denied any radiation to jaw or arm, any diaphoresis, nausea, 

dyspnea associated with it. He does not smoke.


Source:  patient


Date Seen


18


Time Seen by Provider:  10:10


Attending Physician


Valentina Mahmood MD


PCP


No,Local Physician


Referring Physician





Date of Admission


Feb 10, 2018 at 5:17 pm





Home Medications & Allergies


Home Medications


Reviewed patient Home Medication Reconciliation Form





Allergies





Allergies


Coded Allergies


  No Known Drug Allergies (Unverified2/10/18)








Past Medical-Social-Family Hx


Patient Social History


Employed/Student:  employed


Alcohol Use:  Regular Use (12 pack on weekends)


Number of Drinks Today:  12


Alcohol Beverage of Choice:  Beer


Recreational Drug Use:  No


Smoking Status:  Never a Smoker


Physical Abuse Screen:  No


Sexual Abuse:  No


Recent Foreign Travel:  No


Contact w/other who traveled:  No


Recent Hopitalizations:  No


Recent Infectious Disease Expo:  No





Seasonal Allergies


Seasonal Allergies:  No





Surgeries


No





Respiratory


No


Currently Using CPAP:  No


Currently Using BIPAP:  No





Cardiovascular


Yes





Neurological


No





Reproductive System


Sexually Transmitted Disease:  No


HIV/AIDS:  No





Genitourinary


No





Gastrointestinal


No





Musculoskeletal


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





Endocrine


History of Endocrine Disorders:  No





HEENT


History of HEENT Disorders:  No





Cancer


No





Psychosocial


History of Psychiatric Problem:  No





Integumentary


History of Skin or Integumenta:  No





Blood Transfusions


History of Blood Disorders:  No





Reviewed Nursing Assessment


Reviewed/Agree w Nursing PMH:  Yes





Family Medical History


Significant Family History:  Heart Disease (mother had MI at 82yo)


Family Hx:  


Myocardial infarction


  19 MOTHER


  G8 BROTHER


  G8 SISTER


Neoplasm


  19 FATHER





Review of Systems


Constitutional:  No chills, No fever


EENTM:  No blurred vision, No double vision, No nose congestion, No throat pain


Respiratory:  No cough, No dyspnea on exertion, No short of breath


Cardiovascular:  chest pain, No edema, No Hx of Intervention, No palpitations, 

No syncope


Gastrointestinal:  No abdominal pain, No constipation, No diarrhea, No nausea, 

No vomiting


Genitourinary:  No dysuria, No frequency


Musculoskeletal:  No joint pain, No muscle pain


Skin:  No lesions, No rash


Psychiatric/Neurological:  Denies Headache, Denies Numbness, Denies Tingling





Physical Exam


Physical Exam


Vital Signs





Vital Signs - First Documented








 2/10/18





 20:00


 


Temp 98.4





Capillary Refill : Less Than 3 Seconds


General Appearance:  No Apparent Distress, WD/WN


HEENT:  PERRL/EOMI, Moist Mucous Membranes


Neck:  Non Tender, Supple


Respiratory:  Chest Non Tender, Lungs Clear, No Respiratory Distress


Cardiovascular:  Regular Rate, Rhythm, No JVD, No Murmur, Normal Peripheral 

Pulses


Gastrointestinal:  Normal Bowel Sounds, Non Tender, Soft


Extremity:  Normal Capillary Refill, No Calf Tenderness, No Pedal Edema


Neurologic/Psychiatric:  Alert, Oriented x3, Normal Mood/Affect


Skin:  Normal Color, Warm/Dry





Results


Results/Procedures


Lab


Laboratory Tests


2/10/18 14:13








18 04:58











Radiology


Date of Exam:02/10/18





CHEST 1 VIEW, AP/PA ONLY








EXAMINATION: Portable erect AP chest obtained at  232h.





INDICATION: Chest pain





The heart size is within normal limits and stable when compared


to 10/07/2008. The crowded bronchovascular markings in the right


infrahilar region seen on the prior study are again evident and


no different. There is no sign of failure, pneumonia or a pleural


effusion to suggest an acute abnormality. The mediastinum is not


widened. The osseous structures are intact. There is deformity of


the left clavicle due to prior trauma. 





IMPRESSION:


There is no evidence for an acute cardiopulmonary abnormality.





Short Stay Diagnosis


Discharge Diagnosis-Short Stay


Admission Diagnosis


Chest pain


Final Discharge Diagnosis


atypical chest pain





Conclusion


Plan


See problems





Diagnosis/Problems


Diagnosis/Problems





(1) Chest pain


Status:  Acute


Assessment & Plan:  Seem atypical in nature


No evidence of ACS


Troponins neg x3


Received ASA in ER


Cardiology consulted, appreciate recs


ASCVD risk is 4.3%- will not initiate statin at this time


Qualifiers:  


   Qualified Codes:  R07.9 - Chest pain, unspecified


(2) Alcohol intoxication


Status:  Acute


Assessment & Plan:  ETOH 319 on arrival


Has never had withdrawal before


Recommend avoidance of binge drinking


Qualifiers:  


   Qualified Codes:  F10.920 - Alcohol use, unspecified with intoxication, 

uncomplicated


(3) Hypertension


Status:  Acute


Assessment & Plan:  Continue Norvasc as much improved


Qualifiers:  


   Qualified Codes:  I10 - Essential (primary) hypertension





Clinical Quality Measures


AMI/AHF:


ASA po Prior to arrival:  Yes





DVT/VTE Risk/Contraindication:


Risk Factor Score Per Nursin


RFS Level Per Nursing on Admit:  1=Low/No VTE PPX











VALENTINA MAHMOOD MD 2018 10:39